# Patient Record
Sex: MALE | Race: WHITE | NOT HISPANIC OR LATINO | URBAN - METROPOLITAN AREA
[De-identification: names, ages, dates, MRNs, and addresses within clinical notes are randomized per-mention and may not be internally consistent; named-entity substitution may affect disease eponyms.]

---

## 2022-09-19 ENCOUNTER — OFFICE VISIT (OUTPATIENT)
Dept: URGENT CARE | Facility: CLINIC | Age: 52
End: 2022-09-19
Payer: COMMERCIAL

## 2022-09-19 VITALS
TEMPERATURE: 98.7 F | HEART RATE: 81 BPM | WEIGHT: 193.4 LBS | OXYGEN SATURATION: 98 % | RESPIRATION RATE: 16 BRPM | BODY MASS INDEX: 28.56 KG/M2

## 2022-09-19 DIAGNOSIS — T63.441A BEE STING, ACCIDENTAL OR UNINTENTIONAL, INITIAL ENCOUNTER: Primary | ICD-10-CM

## 2022-09-19 PROCEDURE — 99204 OFFICE O/P NEW MOD 45 MIN: CPT | Performed by: PHYSICIAN ASSISTANT

## 2022-09-19 RX ORDER — PREDNISONE 20 MG/1
40 TABLET ORAL DAILY
Qty: 6 TABLET | Refills: 0 | Status: SHIPPED | OUTPATIENT
Start: 2022-09-19 | End: 2022-09-19

## 2022-09-19 RX ORDER — PREDNISONE 20 MG/1
40 TABLET ORAL DAILY
Qty: 14 TABLET | Refills: 0 | Status: SHIPPED | OUTPATIENT
Start: 2022-09-19 | End: 2022-09-26

## 2022-09-19 RX ORDER — ASPIRIN 81 MG/1
1 TABLET, CHEWABLE ORAL DAILY
COMMUNITY

## 2022-09-19 NOTE — PROGRESS NOTES
[facility]        NAME: Javier Sandhu is a 46 y o  male  : 1970    MRN: [id number]  DATE: 2022  TIME: 12:11 PM    Assessment and Plan   Bee sting, accidental or unintentional, initial encounter [T63 [id number]]  1  Bee sting, accidental or unintentional, initial encounter  predniSONE 20 mg tablet    DISCONTINUED: predniSONE 20 mg tablet         Patient Instructions       Follow up with PCP in 3-5 days  Proceed to  ER if symptoms worsen  Chief Complaint     Chief Complaint   Patient presents with   [address] by a bee yesterday in hand  Hand and arm starting to swell  Airway clear  History of Present Illness       HPI    Review of Systems   Review of Systems   Constitutional: Negative for chills and fever  HENT: Negative for trouble swallowing  Respiratory: Negative for shortness of breath  Skin: Positive for wound  Bee sting right hand    Neurological: Negative for weakness and numbness  Current Medications       Current Outpatient Medications:     aspirin 81 mg chewable tablet, Chew 1 tablet daily, Disp: , Rfl:     predniSONE 20 mg tablet, Take 2 tablets (40 mg total) by mouth daily for 7 days, Disp: 14 tablet, Rfl: 0    Zinc Acetate 50 MG CAPS, Take 1 tablet by mouth daily, Disp: , Rfl:     Current Allergies     Allergies as of 2022    (No Known Allergies)            The following portions of the patient's history were reviewed and updated as appropriate: allergies, current medications, past family history, past medical history, past social history, past surgical history and problem list      Past Medical History:   Diagnosis Date    High cholesterol        History reviewed  No pertinent surgical history  History reviewed  No pertinent family history  Medications have been verified          Objective   Pulse 81   Temp 98 7 °F (37 1 °C) (Tympanic)   Resp 16   Wt 87 7 kg (193 lb 6 4 oz)   SpO2 98%   BMI 28 56 kg/m² Physical Exam     Physical Exam  Constitutional:       General: He is not in acute distress  Appearance: He is not toxic-appearing  Cardiovascular:      Rate and Rhythm: Normal rate  Pulmonary:      Effort: Pulmonary effort is normal       Comments: No tachypnea  Tolerating oral secretions   Musculoskeletal:      Comments: Bee sting to right hand with surrounding edema and erythema  Neurovascularly intact  Full ROM  Not warm    Neurological:      Mental Status: He is alert     Psychiatric:         Mood and Affect: Mood normal          Behavior: Behavior normal